# Patient Record
Sex: MALE | Race: WHITE
[De-identification: names, ages, dates, MRNs, and addresses within clinical notes are randomized per-mention and may not be internally consistent; named-entity substitution may affect disease eponyms.]

---

## 2017-09-23 ENCOUNTER — HOSPITAL ENCOUNTER (OUTPATIENT)
Dept: HOSPITAL 53 - M WUC | Age: 22
End: 2017-09-23
Attending: PHYSICIAN ASSISTANT
Payer: COMMERCIAL
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
## 2017-09-23 DIAGNOSIS — S61.200S: Primary | ICD-10-CM

## 2017-09-24 NOTE — REP
RIGHT SECOND DIGIT:

 

Four views of the right second digit are performed and demonstrate no fracture,

dislocation or intrinsic bone disease. No radiopaque foreign body is seen in the

soft tissues.

 

 

Signed by

Alfonzo Odom MD 09/24/2017 07:03 P

## 2017-10-03 ENCOUNTER — HOSPITAL ENCOUNTER (OUTPATIENT)
Dept: HOSPITAL 53 - M RAD | Age: 22
End: 2017-10-03
Attending: PHYSICIAN ASSISTANT

## 2017-10-03 DIAGNOSIS — M25.561: Primary | ICD-10-CM

## 2017-10-03 NOTE — REP
RIGHT KNEE SERIES:  FIVE VIEWS.

 

HISTORY:  Right knee pain.

 

Comparison right knee radiographs are from June 17, 2008.

 

FINDINGS:  Five views of the right knee demonstrate normal bones, joints, and

soft tissues.  No evidence of arthropathy or joint effusion.  No fracture or

erosive change seen.

 

IMPRESSION:

 

Negative right knee radiographs.

 

 

Signed by

González Morrison MD 10/03/2017 03:29 P

## 2017-10-03 NOTE — REP
STANDING AP VIEW OF BOTH KNEES:  SINGLE VIEW.

 

HISTORY:  Right knee pain.

 

Comparison right knee radiographs are from June 17, 2008.

 

FINDINGS:  Standing AP view of both knees shows no evidence of joint space

narrowing.  Bones, joints, and soft tissues are unremarkable.

 

IMPRESSION:

 

Negative standing AP view of both knees.

 

 

Signed by

González Morrison MD 10/03/2017 03:29 P

## 2017-11-14 ENCOUNTER — HOSPITAL ENCOUNTER (EMERGENCY)
Dept: HOSPITAL 53 - M ED | Age: 22
Discharge: PSYCHIATRIC OR SUBSTANCE USE FACILITY | End: 2017-11-14
Payer: COMMERCIAL

## 2017-11-14 VITALS — DIASTOLIC BLOOD PRESSURE: 73 MMHG | SYSTOLIC BLOOD PRESSURE: 130 MMHG

## 2017-11-14 VITALS — HEIGHT: 71 IN | BODY MASS INDEX: 21.02 KG/M2 | WEIGHT: 150.13 LBS

## 2017-11-14 DIAGNOSIS — F17.200: ICD-10-CM

## 2017-11-14 DIAGNOSIS — R45.851: Primary | ICD-10-CM

## 2017-11-14 DIAGNOSIS — Z91.5: ICD-10-CM

## 2017-11-14 DIAGNOSIS — Z79.899: ICD-10-CM

## 2017-11-14 DIAGNOSIS — G89.29: ICD-10-CM

## 2017-11-14 LAB
ALBUMIN SERPL BCG-MCNC: 4.5 GM/DL (ref 3.2–5.2)
ALBUMIN/GLOB SERPL: 1.55 {RATIO} (ref 1–1.93)
ALP SERPL-CCNC: 60 U/L (ref 45–117)
ALT SERPL W P-5'-P-CCNC: 42 U/L (ref 12–78)
ANION GAP SERPL CALC-SCNC: 8 MEQ/L (ref 8–16)
AST SERPL-CCNC: 24 U/L (ref 7–37)
BILIRUB CONJ SERPL-MCNC: 0.2 MG/DL (ref 0–0.2)
BILIRUB SERPL-MCNC: 0.7 MG/DL (ref 0.2–1)
BUN SERPL-MCNC: 13 MG/DL (ref 7–18)
CALCIUM SERPL-MCNC: 9.4 MG/DL (ref 8.5–10.1)
CHLORIDE SERPL-SCNC: 106 MEQ/L (ref 98–107)
CO2 SERPL-SCNC: 25 MEQ/L (ref 21–32)
CREAT SERPL-MCNC: 1.11 MG/DL (ref 0.7–1.3)
ERYTHROCYTE [DISTWIDTH] IN BLOOD BY AUTOMATED COUNT: 12.1 % (ref 11.5–14.5)
GFR SERPL CREATININE-BSD FRML MDRD: > 60 ML/MIN/{1.73_M2} (ref 60–?)
GLUCOSE SERPL-MCNC: 94 MG/DL (ref 70–105)
MCH RBC QN AUTO: 28.6 PG (ref 27–33)
MCHC RBC AUTO-ENTMCNC: 33.9 G/DL (ref 32–36.5)
MCV RBC AUTO: 84.4 FL (ref 80–96)
METHADONE UR QL SCN: NEGATIVE
NRBC BLD AUTO-RTO: 0 % (ref 0–0)
PLATELET # BLD AUTO: 221 10^3/UL (ref 150–450)
POTASSIUM SERPL-SCNC: 4.2 MEQ/L (ref 3.5–5.1)
PROT SERPL-MCNC: 7.4 GM/DL (ref 6.4–8.2)
SODIUM SERPL-SCNC: 139 MEQ/L (ref 136–145)
WBC # BLD AUTO: 3.5 10^3/UL (ref 4–10)

## 2017-11-14 PROCEDURE — 99285 EMERGENCY DEPT VISIT HI MDM: CPT

## 2017-11-14 PROCEDURE — 85027 COMPLETE CBC AUTOMATED: CPT

## 2017-11-14 PROCEDURE — 80076 HEPATIC FUNCTION PANEL: CPT

## 2017-11-14 PROCEDURE — 80048 BASIC METABOLIC PNL TOTAL CA: CPT

## 2017-11-14 PROCEDURE — 84443 ASSAY THYROID STIM HORMONE: CPT

## 2017-11-14 PROCEDURE — 80307 DRUG TEST PRSMV CHEM ANLYZR: CPT

## 2017-11-14 PROCEDURE — 93005 ELECTROCARDIOGRAM TRACING: CPT

## 2017-11-15 NOTE — ECGEPIP
Stationary ECG Study

                           [facility] - ED

                                       

                                       Test Date:    2017

Pat Name:     AAKASH HERRON             Department:   

Patient ID:   [id number]                 Room:         -

Gender:       M                        Technician:   [email or web address]

:          1995               Requested By: Danielle Oliveira 

Order Number: [id number]     Reading MD:   Danielle Oliveira

                                 Measurements

Intervals                              Axis          

Rate:         65                       P:            70

GA:           178                      QRS:          81

QRSD:         99                       T:            43

QT:           359                                    

QTc:          375                                    

                           Interpretive Statements

SINUS RHYTHM

NO PRIOR FOR COMPARISON

Electronically Signed On 11- 14:12:38 EST by Danielle Oliveira

## 2020-01-29 ENCOUNTER — HOSPITAL ENCOUNTER (OUTPATIENT)
Dept: HOSPITAL 53 - M RAD | Age: 25
End: 2020-01-29
Attending: PHYSICIAN ASSISTANT
Payer: OTHER GOVERNMENT

## 2020-01-29 DIAGNOSIS — R42: Primary | ICD-10-CM

## 2020-01-29 NOTE — REP
CAROTID DUPLEX ULTRASOUND:  01/29/2020.

 

Clinical history: Episodes of dizziness.

 

Findings:  There are no prior studies in this 25-year-old.

 

I see no intimal thickening or significant plaque in the right common carotid

artery.  Trace amounts of soft plaque at the bulb and proximal ICA.  No calcific

plaque.

 

The left common carotid artery is also without intimal thickening or soft plaque.

The bulb shows trace amounts of soft plaque.  There is some in the proximal ICA.

 

Peak velocities:         Right        Left

 

CCA systolic             1.03 m/s           1.1 m/s

 

ICA systolic       0.68 m/s           0.76 m/s

 

ICA diastolic            0.29 m/s           0.33 m/s

 

ECA systolic             0.80 m/s           0.66 m/s

 

IC/CC ratio        0.68         0.73

 

Antegrade flow is seen in both vertebral arteries.  The Doppler waveform analysis

shows no spectral broadening or filling of the systolic window for either

internal carotid.

 

Impression:

 

1.  Trace amounts of soft plaque with no hemodynamically significant or flow

restricting lesion in the internal carotids.

 

2.  Cranial directional flow vertebral arteries.

 

 

Electronically Signed by

Eliecer Hamilton MD 01/29/2020 08:30 P

## 2022-09-21 ENCOUNTER — HOSPITAL ENCOUNTER (OUTPATIENT)
Dept: HOSPITAL 53 - M RAD | Age: 27
End: 2022-09-21
Attending: PHYSICIAN ASSISTANT

## 2022-09-21 DIAGNOSIS — M54.50: Primary | ICD-10-CM
